# Patient Record
Sex: FEMALE | URBAN - METROPOLITAN AREA
[De-identification: names, ages, dates, MRNs, and addresses within clinical notes are randomized per-mention and may not be internally consistent; named-entity substitution may affect disease eponyms.]

---

## 2018-04-16 ENCOUNTER — IMPORTED ENCOUNTER (OUTPATIENT)
Dept: URBAN - METROPOLITAN AREA CLINIC 9 | Facility: CLINIC | Age: 63
End: 2018-04-16

## 2018-05-21 ENCOUNTER — IMPORTED ENCOUNTER (OUTPATIENT)
Dept: URBAN - METROPOLITAN AREA CLINIC 9 | Facility: CLINIC | Age: 63
End: 2018-05-21

## 2021-02-11 NOTE — PATIENT DISCUSSION
IN CENTER OF LEFT EYE - NOTICED TODAY WHEN HE CLOSED HIS RIGHT EYE - MAY BE 2ND VIT OPACIFICATION - RECOM EVAL WITH HW FOR PPV.

## 2021-10-16 ASSESSMENT — VISUAL ACUITY
OS_SC: 20/30 -2 SN
OD_SC: 20/30 -2 SN
OS_SC: 20/30 - SN
OD_SC: 20/30 - SN

## 2021-10-16 ASSESSMENT — TONOMETRY
OD_IOP_MMHG: 12
OD_IOP_MMHG: 16
OS_IOP_MMHG: 14
OS_IOP_MMHG: 16

## 2021-12-06 NOTE — PATIENT DISCUSSION
2 11 21 IN CENTER OF LEFT EYE - NOTICED TODAY WHEN HE CLOSED HIS RIGHT EYE - MAY BE 2ND VIT OPACIFICATION - RECOM EVAL WITH HW FOR PPV.

## 2021-12-06 NOTE — PATIENT DISCUSSION
12 6 21 NEW - ETIOLOGY UNCLEAR - NO SIG DR ON FA FROM 2 11 21.  PT ON COUMADIN - LEVEL 1.78 SATURDAY - NO DET TEAR OR BREAK.  TO FOLLOW FOR NOW.  PT TO CONTACT HIS HEAT SPECIALIST TO SEE IF IT IS OK TO HOLD ON COUMADIN FOR A WEEK OR TWO.  HE IS ON IT 2ND CHF AND VALVE SURGERY.

## 2021-12-15 NOTE — PATIENT DISCUSSION
12 15 21 PERSISTENT - REVIEWED OPTION PPV - PT WANTS TO WAIT AS IT HAS HAPPENED AND CLEAR ON ITS OWN IN THE PAST.

## 2021-12-28 NOTE — PATIENT DISCUSSION
"12 28 21 PT RESUMED HIS COUMADIN - FEELS GETTING BETTER - TO CONTINUE TO FOLLOW - ""I PREFER TO LET IT GO A LITTLE LONGER""."

## 2022-04-27 NOTE — PATIENT DISCUSSION
ARTIFICIAL TEARS to affected eye(s) as needed. Requested Prescriptions   Pending Prescriptions Disp Refills     XIFAXAN 550 MG TABS tablet [Pharmacy Med Name: XIFAXAN 550MG TABS] 60 tablet 0     Sig: TAKE ONE TABLET BY MOUTH TWICE A DAY       There is no refill protocol information for this order

## 2022-12-05 NOTE — PATIENT DISCUSSION
The patient is at high risk for a choroidal neovascular membrane. NO CNV SEEN TODAY. Dry ARMD is responsible for some decrease in vision. Is This A New Presentation, Or A Follow-Up?: Skin Lesions What Type Of Note Output Would You Prefer (Optional)?: Bullet Format How Severe Is Your Skin Lesion?: mild Has Your Skin Lesion Been Treated?: not been treated

## 2022-12-05 NOTE — PATIENT DISCUSSION
Patient instructed to sleep WITH HEAD OF BED ELEVATED. Test Reason : OPEN

Blood Pressure : ***/*** mmHG

Vent. Rate : 065 BPM     Atrial Rate : 065 BPM

   P-R Int : 172 ms          QRS Dur : 073 ms

    QT Int : 466 ms       P-R-T Axes : 073 074 059 degrees

   QTc Int : 485 ms

 

Sinus rhythm

Borderline abnrm T, anterolateral leads

 

Confirmed by Scotty Garcia ([id number]) on 12/12/2018 5:59:44 AM

 

Referred By:             Confirmed By:Scotty Garcia
